# Patient Record
Sex: FEMALE | Race: ASIAN | NOT HISPANIC OR LATINO
[De-identification: names, ages, dates, MRNs, and addresses within clinical notes are randomized per-mention and may not be internally consistent; named-entity substitution may affect disease eponyms.]

---

## 2020-10-07 ENCOUNTER — DASHBOARD ENCOUNTERS (OUTPATIENT)
Age: 55
End: 2020-10-07

## 2020-10-07 ENCOUNTER — OFFICE VISIT (OUTPATIENT)
Dept: URBAN - METROPOLITAN AREA CLINIC 31 | Facility: CLINIC | Age: 55
End: 2020-10-07

## 2020-10-07 VITALS
HEART RATE: 80 BPM | HEIGHT: 59 IN | DIASTOLIC BLOOD PRESSURE: 84 MMHG | WEIGHT: 175 LBS | BODY MASS INDEX: 35.28 KG/M2 | OXYGEN SATURATION: 99 % | SYSTOLIC BLOOD PRESSURE: 138 MMHG

## 2020-10-07 PROBLEM — 9631008: Status: ACTIVE | Noted: 2020-10-07

## 2020-10-07 RX ORDER — MULTIVIT-MIN/IRON/FOLIC ACID/K 18-600-40
1 CAPSULE ORAL ONCE A DAY
Status: ACTIVE | COMMUNITY

## 2020-10-07 RX ORDER — ADALIMUMAB 40MG/0.4ML
1 KIT SUBCUTANEOUS EVERY OTHER WEEK
Status: ACTIVE | COMMUNITY

## 2020-10-07 RX ORDER — SODIUM SULFATE, POTASSIUM SULFATE, MAGNESIUM SULFATE 17.5; 3.13; 1.6 G/ML; G/ML; G/ML
177 ML SOLUTION, CONCENTRATE ORAL
Qty: 1 KIT | Refills: 0 | OUTPATIENT
Start: 2020-10-07

## 2020-10-07 NOTE — HPI-MIGRATED HPI
;     Colorectal Cancer Screening : Patient presents today for a colorectal cancer screening. She currently admits 1-2 bowel movements per day with normal and formed stools. Patient denies seeing any blood or mucous in her stool. Patient denies melena. Patient admits heartburn. She states this is mild and she will take a TUMS, which resolves heartburn. Patient states this is her first colonoscopy. Patient denies having a history of bleeding disorders or respiratory diseases. Patient states that she will have some sharp lower abdominal pain, once every few months or so. She believes this is possibly related to the fact that she has recently gone through menopause (her cycles stopped in March of this year).  Neck involvement is noted for Ankylosing spondylitis.;

## 2020-10-07 NOTE — EXAM-MIGRATED EXAMINATIONS
GENERAL APPEARANCE: - in no acute distress, well developed, well nourished;   HEAD: - normocephalic, atraumatic;   EYES: - pupils equal, round, reactive to light and accommodation, sclera anicteric;   EARS: - normal;   ORAL CAVITY: - mucosa moist, no lesions;   THROAT: - clear.  MP 2.;   NECK/THYROID: - neck supple, full range of motion, no cervical lymphadenopathy, trachea midline, thyroid normal, , no supraclavicular lymphadenopathy;   LYMPH NODES: - no cervical adenopathy, ..., no supraclavicular adenopathy, cervical nodes firm;   SKIN: - no suspicious lesions, warm and dry;   HEART: - no murmurs, regular rate and rhythm, S1, S2 normal, no S3, S4, no rubs;   LUNGS: - clear to auscultation bilaterally;   CHEST: - normal;   ABDOMEN: - normal, bowel sounds present, soft, nontender, nondistended;   RECTAL: - not examined;   EXTREMITIES: - no clubbing, cyanosis, or edema;   NEUROLOGIC: - nonfocal, motor strength normal upper and lower extremities, sensory exam intact;   PSYCH: - alert, oriented, ..., Mood and affect are approprioate for current condition;

## 2020-11-05 ENCOUNTER — TELEPHONE ENCOUNTER (OUTPATIENT)
Dept: URBAN - METROPOLITAN AREA CLINIC 35 | Facility: CLINIC | Age: 55
End: 2020-11-05

## 2020-11-05 RX ORDER — SODIUM SULFATE, POTASSIUM SULFATE, MAGNESIUM SULFATE 17.5; 3.13; 1.6 G/ML; G/ML; G/ML
177 ML SOLUTION, CONCENTRATE ORAL
Qty: 1 KIT | Refills: 0 | OUTPATIENT
Start: 2020-10-07

## 2020-11-13 ENCOUNTER — OFFICE VISIT (OUTPATIENT)
Dept: URBAN - METROPOLITAN AREA MEDICAL CENTER 10 | Facility: MEDICAL CENTER | Age: 55
End: 2020-11-13

## 2020-12-01 ENCOUNTER — OFFICE VISIT (OUTPATIENT)
Dept: URBAN - METROPOLITAN AREA CLINIC 31 | Facility: CLINIC | Age: 55
End: 2020-12-01